# Patient Record
Sex: FEMALE
[De-identification: names, ages, dates, MRNs, and addresses within clinical notes are randomized per-mention and may not be internally consistent; named-entity substitution may affect disease eponyms.]

---

## 2017-07-28 ENCOUNTER — APPOINTMENT (OUTPATIENT)
Dept: FAMILY MEDICINE | Facility: CLINIC | Age: 82
End: 2017-07-28

## 2023-12-19 ENCOUNTER — APPOINTMENT (OUTPATIENT)
Dept: FAMILY MEDICINE | Facility: CLINIC | Age: 88
End: 2023-12-19
Payer: MEDICAID

## 2023-12-19 VITALS
SYSTOLIC BLOOD PRESSURE: 130 MMHG | HEIGHT: 57 IN | OXYGEN SATURATION: 94 % | HEART RATE: 94 BPM | BODY MASS INDEX: 21.36 KG/M2 | TEMPERATURE: 97.3 F | WEIGHT: 99 LBS | DIASTOLIC BLOOD PRESSURE: 80 MMHG

## 2023-12-19 DIAGNOSIS — Z13.220 ENCOUNTER FOR SCREENING FOR LIPOID DISORDERS: ICD-10-CM

## 2023-12-19 DIAGNOSIS — E56.9 VITAMIN DEFICIENCY, UNSPECIFIED: ICD-10-CM

## 2023-12-19 DIAGNOSIS — Z00.00 ENCOUNTER FOR GENERAL ADULT MEDICAL EXAMINATION W/OUT ABNORMAL FINDINGS: ICD-10-CM

## 2023-12-19 DIAGNOSIS — R32 UNSPECIFIED URINARY INCONTINENCE: ICD-10-CM

## 2023-12-19 DIAGNOSIS — Z13.29 ENCOUNTER FOR SCREENING FOR OTHER SUSPECTED ENDOCRINE DISORDER: ICD-10-CM

## 2023-12-19 DIAGNOSIS — Z13.228 ENCOUNTER FOR SCREENING FOR OTHER SUSPECTED ENDOCRINE DISORDER: ICD-10-CM

## 2023-12-19 DIAGNOSIS — Z78.9 OTHER SPECIFIED HEALTH STATUS: ICD-10-CM

## 2023-12-19 DIAGNOSIS — I48.91 UNSPECIFIED ATRIAL FIBRILLATION: ICD-10-CM

## 2023-12-19 DIAGNOSIS — Z13.0 ENCOUNTER FOR SCREENING FOR OTHER SUSPECTED ENDOCRINE DISORDER: ICD-10-CM

## 2023-12-19 PROCEDURE — 99203 OFFICE O/P NEW LOW 30 MIN: CPT | Mod: 25

## 2023-12-19 PROCEDURE — 99387 INIT PM E/M NEW PAT 65+ YRS: CPT | Mod: 25

## 2023-12-19 RX ORDER — ALPRAZOLAM 0.5 MG/1
0.5 TABLET ORAL
Refills: 0 | Status: ACTIVE | COMMUNITY

## 2023-12-19 RX ORDER — ASPIRIN 81 MG
81 TABLET, DELAYED RELEASE (ENTERIC COATED) ORAL
Refills: 0 | Status: ACTIVE | COMMUNITY

## 2023-12-19 NOTE — PHYSICAL EXAM
[No Acute Distress] : no acute distress [Well-Appearing] : well-appearing [No Accessory Muscle Use] : no accessory muscle use [Clear to Auscultation] : lungs were clear to auscultation bilaterally [Normal Rate] : normal rate  [Regular Rhythm] : with a regular rhythm [Normal S1, S2] : normal S1 and S2 [Soft] : abdomen soft [Non Tender] : non-tender [Non-distended] : non-distended [Coordination Grossly Intact] : coordination grossly intact [Deep Tendon Reflexes (DTR)] : deep tendon reflexes were 2+ and symmetric [Normal Affect] : the affect was normal [Normal Insight/Judgement] : insight and judgment were intact [No Edema] : there was no peripheral edema

## 2023-12-20 LAB
25(OH)D3 SERPL-MCNC: 38.4 NG/ML
ALBUMIN SERPL ELPH-MCNC: 4 G/DL
ALP BLD-CCNC: 131 U/L
ALT SERPL-CCNC: 26 U/L
ANION GAP SERPL CALC-SCNC: 11 MMOL/L
AST SERPL-CCNC: 31 U/L
BASOPHILS # BLD AUTO: 0.07 K/UL
BASOPHILS NFR BLD AUTO: 0.9 %
BILIRUB SERPL-MCNC: 0.7 MG/DL
BUN SERPL-MCNC: 23 MG/DL
CALCIUM SERPL-MCNC: 9.2 MG/DL
CHLORIDE SERPL-SCNC: 103 MMOL/L
CHOLEST SERPL-MCNC: 178 MG/DL
CO2 SERPL-SCNC: 27 MMOL/L
CREAT SERPL-MCNC: 0.74 MG/DL
EGFR: 78 ML/MIN/1.73M2
EOSINOPHIL # BLD AUTO: 0.09 K/UL
EOSINOPHIL NFR BLD AUTO: 1.2 %
ESTIMATED AVERAGE GLUCOSE: 97 MG/DL
FOLATE SERPL-MCNC: 13.4 NG/ML
GLUCOSE SERPL-MCNC: 91 MG/DL
HBA1C MFR BLD HPLC: 5 %
HCT VFR BLD CALC: 42.1 %
HDLC SERPL-MCNC: 57 MG/DL
HGB BLD-MCNC: 13.4 G/DL
IMM GRANULOCYTES NFR BLD AUTO: 0.3 %
LDLC SERPL CALC-MCNC: 107 MG/DL
LYMPHOCYTES # BLD AUTO: 1.28 K/UL
LYMPHOCYTES NFR BLD AUTO: 17.3 %
MAN DIFF?: NORMAL
MCHC RBC-ENTMCNC: 30.5 PG
MCHC RBC-ENTMCNC: 31.8 GM/DL
MCV RBC AUTO: 95.9 FL
MONOCYTES # BLD AUTO: 0.64 K/UL
MONOCYTES NFR BLD AUTO: 8.6 %
NEUTROPHILS # BLD AUTO: 5.31 K/UL
NEUTROPHILS NFR BLD AUTO: 71.7 %
NONHDLC SERPL-MCNC: 120 MG/DL
PLATELET # BLD AUTO: 248 K/UL
POTASSIUM SERPL-SCNC: 3.9 MMOL/L
PROT SERPL-MCNC: 7.2 G/DL
RBC # BLD: 4.39 M/UL
RBC # FLD: 13.3 %
SODIUM SERPL-SCNC: 140 MMOL/L
TRIGL SERPL-MCNC: 72 MG/DL
TSH SERPL-ACNC: 1.06 UIU/ML
VIT B12 SERPL-MCNC: 596 PG/ML
WBC # FLD AUTO: 7.41 K/UL

## 2024-01-10 ENCOUNTER — APPOINTMENT (OUTPATIENT)
Dept: FAMILY MEDICINE | Facility: CLINIC | Age: 89
End: 2024-01-10
Payer: MEDICAID

## 2024-01-10 PROCEDURE — 99442: CPT

## 2024-01-22 ENCOUNTER — APPOINTMENT (OUTPATIENT)
Dept: FAMILY MEDICINE | Facility: CLINIC | Age: 89
End: 2024-01-22
Payer: MEDICAID

## 2024-01-22 DIAGNOSIS — Z91.89 OTHER SPECIFIED PERSONAL RISK FACTORS, NOT ELSEWHERE CLASSIFIED: ICD-10-CM

## 2024-01-22 DIAGNOSIS — Z91.81 HISTORY OF FALLING: ICD-10-CM

## 2024-01-22 PROCEDURE — 99214 OFFICE O/P EST MOD 30 MIN: CPT | Mod: 95

## 2024-01-22 NOTE — PHYSICAL EXAM
[No Acute Distress] : no acute distress [de-identified] : open sacral wound, difficult to quantify size due to video appointment. no oozing, drainage or odor from wound

## 2024-01-22 NOTE — ADDENDUM
[FreeTextEntry1] : ADDENDUM 1/22/2024:  Medical necessity - semi-electric hospital bed Patient is an 89yo female with PMH of AFib, s/p PPM, dementia, chronic low back pain with sciatica, Paget disease, OA, chronic hepatitis C with severe functional limitations with medical need for a semi-electric hospital bed with bed overlay and bedrails, head of bed >30 degrees given altered mental status and aspiration risk. Requires semi-electric hospital bed to raise head of bed >30 degrees and position patient which otherwise not feasible with an ordinary bed that does not have traction equipment. Patient is unable to perform ADLs and IADLs independently, requires assistance with all functional activities. Patient's range of motion limited, strength 2/5 in all extremities. Patient is A&Ox1 to person only, with progressive dementia. She now has a Stage 2 decubitus sacral ulcer with concern that current bed may have led to degradation of wound. Patient requires 1 person assist for any bed movements, requires 1 person transfer. Ambulation is dependent on rolling walker or wheelchair. Has aides 12 hours a day, 5 days per week.

## 2024-01-22 NOTE — HISTORY OF PRESENT ILLNESS
[FreeTextEntry1] : CPE, establish care  [de-identified] : 87yo female with PMH of AFib, s/p PPM, dementia, chronic low back pain with sciatica, Paget disease, OA, chronic hepatitis C who presents to the office to establish care.   Patient was formerly a patient under Dr. Hernán Campbell. She was last seen by Dr. Campbell about a month ago for a normal follow up appointment. Daughter was very upset with the care at that office.   History of atrial fibrillation, she is no longer on rate controlling agents as she had adverse reactions, which made patient "crazy." No longer on anticoagulation due to concern of her age and fall risk.   Daughter is concerned because during the full moon patient will sundown and talk to family members who are not there. Uses Xanax 0.5mg PRN for acute agitation/psychosis at night. This was last prescribed by previous PMD on 11/1/23.   Has aides that come to the house 70 hours a week (5 days x12 hours).   Has walker and wheelchair for ambulation. She is dependent on these for ambulation and cannot walk without them. Additionally, patient is unable to get out of bed without assistance. Daughter is requesting a hospital bed to help with her ambulation.

## 2024-01-22 NOTE — REVIEW OF SYSTEMS
[Memory Loss] : memory loss [Fever] : no fever [Chills] : no chills [Chest Pain] : no chest pain [Shortness Of Breath] : no shortness of breath [Palpitations] : no palpitations [Abdominal Pain] : no abdominal pain [Nausea] : no nausea [Constipation] : no constipation [Diarrhea] : diarrhea [Vomiting] : no vomiting [Headache] : no headache [Dizziness] : no dizziness

## 2024-01-22 NOTE — ASSESSMENT
[FreeTextEntry1] : #Sacral ulcer - Presenting for follow up of wound  - Minimal improvement with mupirocin, continues to worsen - Wound is open with slough and macerated edges, concern for Stage 2 sacral wound - Difficult to quantify size on telehealth, but likely 2 inches in diameter - No signs of systemic infection like fever, chills - Patient is immobile, unable to leave house and needs visiting nurse services for wound care for above - Encouraged frequent offloading of area to prevent further tissue injury, this will be feasible with hospital bed - Continue medhoney with nonadhesive bandage to area  #Dementia with behavioral disturbance - Mental status continues to decline - Has long history of intolerance to antipsychotic medications  - Was placed on alprazolam by prior PMD, discussed that benzodiazepines are not preferred choice for elderly population but given intolerance to other medications would recommend to continue on a PRN basis only, limit

## 2024-01-22 NOTE — HISTORY OF PRESENT ILLNESS
[Home] : at home, [unfilled] , at the time of the visit. [Medical Office: ([facility])___] : at the medical office located in  [Family Member] : family member [Verbal consent obtained from patient] : the patient, [unfilled] [FreeTextEntry1] : Follow up [de-identified] : 89yo female with PMH of AFib, s/p PPM, dementia with behavioral disturbance, chronic low back pain with sciatica, Paget disease, OA, chronic hepatitis C who presents for follow up of sacral wound.  Telephonic visit on 1/10/24 with daughter revealed concern for sacral wound. Patient is largely immobile in bed throughout the entire day, she likes to solely lie on her back. There was overall concern for development of pressure injury.  Patient was given mupirocin, daughter reports wound looked worse, now with eschar on top. Instructed by RN at our office to place medhoney and nonadherent dressing over the wound.   Patient does have long history of dementia with behavioral disturbance at night. Has tried Risperdal, Seroquel, nuplazid, melatonin, memantine which all have made confusion much worse. Last PMD gave her alprazolam which sometimes helps with agitation. Daughter is seeking guidance on what to do next for her agitation.   Still waiting on hospital bed, however, patient's daughter had requested fully electronic/motorized bed which is not covered under her insurance.  She is still having issues with picking up ensure from her pharmacy.

## 2024-01-22 NOTE — HEALTH RISK ASSESSMENT
[No] : No [No falls in past year] : Patient reported no falls in the past year [Other reason not done] : Other reason not done [Patient reported mammogram was normal] : Patient reported mammogram was normal [Patient reported colonoscopy was normal] : Patient reported colonoscopy was normal [Change in mental status noted] : Change in mental status noted [Behavior] : difficulty with behavior [Reasoning] : difficulty with reasoning [None] : None [With Family] : lives with family [Some assistance needed] : feeding [Full assistance needed] : managing finances [Reports changes in hearing] : Reports changes in hearing [Reports changes in dental health] : Reports changes in dental health [Smoke Detector] : smoke detector [Carbon Monoxide Detector] : carbon monoxide detector [Designated Healthcare Proxy] : Designated healthcare proxy [Name: ___] : Health Care Proxy's Name: [unfilled]  [Relationship: ___] : Relationship: [unfilled] [Former] : Former [> 15 Years] : > 15 Years [de-identified] : none [de-identified] : ensure [Reports changes in vision] : Reports no changes in vision [MammogramComments] : age out  [PapSmearComments] : age out [ColonoscopyComments] : age out [HepatitisCComments] : chronic hepatitis C  [AdvancecareDate] : 12/23

## 2024-01-22 NOTE — ASSESSMENT
[FreeTextEntry1] : #HCM - Patient presenting for annual physical exam - declines flu vaccine today - Based on USPSTF screening guidelines and shared decision-making with patient and daughter, age out of breast, cervical and colon cancer screenings  - ECG reviewed from previous PMD from 10/2023, shows sinus tachycardia which is known to the patient  - Will check routine blood work today and call patient with results   #Malnutrition - Daughter endorses poor PO intake - Patient takes ensure three times daily with meals for calorie intake - Daughter requesting prescription which she used to receive at prior PMD's office  #Incontinence - Patient with bowel and bladder incontinence requiring adult diapers - Patient's daughter requesting pull-up type of diapers and chucks for cleansing  #Unsteady gait, frailty - Patient requires assistive device to prevent falling and has rolling walker and wheelchair for ambulation - Patient has history of unsteady gait and cannot get out of bed without assistance from aide or family member. Patient requires hospital bed so the caregiver can position patient with moving her body which is otherwise not feasible with an ordinary bed that does not have traction equipment.

## 2024-01-23 PROBLEM — Z91.81 RISK FOR FALLS: Status: ACTIVE | Noted: 2024-01-23

## 2024-01-23 PROBLEM — Z91.89 AT RISK FOR ASPIRATION: Status: ACTIVE | Noted: 2024-01-23

## 2024-02-07 ENCOUNTER — APPOINTMENT (OUTPATIENT)
Dept: FAMILY MEDICINE | Facility: CLINIC | Age: 89
End: 2024-02-07
Payer: MEDICAID

## 2024-02-07 DIAGNOSIS — R19.7 DIARRHEA, UNSPECIFIED: ICD-10-CM

## 2024-02-07 DIAGNOSIS — E86.0 DEHYDRATION: ICD-10-CM

## 2024-02-07 PROCEDURE — 99442: CPT

## 2024-02-16 ENCOUNTER — APPOINTMENT (OUTPATIENT)
Dept: FAMILY MEDICINE | Facility: CLINIC | Age: 89
End: 2024-02-16
Payer: MEDICAID

## 2024-02-16 DIAGNOSIS — L98.429 NON-PRESSURE CHRONIC ULCER OF BACK WITH UNSPECIFIED SEVERITY: ICD-10-CM

## 2024-02-16 DIAGNOSIS — G47.00 INSOMNIA, UNSPECIFIED: ICD-10-CM

## 2024-02-16 DIAGNOSIS — R26.81 UNSTEADINESS ON FEET: ICD-10-CM

## 2024-02-16 DIAGNOSIS — E46 UNSPECIFIED PROTEIN-CALORIE MALNUTRITION: ICD-10-CM

## 2024-02-16 PROCEDURE — 99215 OFFICE O/P EST HI 40 MIN: CPT

## 2024-02-16 RX ORDER — HYDROXYZINE HYDROCHLORIDE 10 MG/1
10 TABLET ORAL
Qty: 30 | Refills: 0 | Status: ACTIVE | COMMUNITY
Start: 2024-02-16 | End: 1900-01-01

## 2024-02-16 NOTE — PHYSICAL EXAM
[de-identified] : ill appearing, does not respond to commands [de-identified] : skin tears of bilateral wrists, wrapped in gauze, left knee wound wrapped in gauze. sacral pressure ulcer is currently dressed and unable to be adequately assessed in this video appointment

## 2024-02-16 NOTE — HISTORY OF PRESENT ILLNESS
[Home] : at home, [unfilled] , at the time of the visit. [Medical Office: ([facility])___] : at the medical office located in  [Family Member] : family member [Verbal consent obtained from patient] : the patient, [unfilled] [FreeTextEntry8] : 87yo female with PMH of AFib, s/p PPM, dementia with behavioral disturbance, chronic low back pain with sciatica, Paget disease, OA, chronic hepatitis C who presents for concern of skin and memory changes. History obtained by daughter Ines.   Patient's daughter concerned about fragile skin on her wrists. Patient has been constantly scratching at her skin constantly. Patient's daughter took the patient to Urgent Care on 2/14/24 as no availability in the office, she was prescribed betamethasone cream to help with itchiness. Mittens placed to help avoid scratching.  Patient has been agitated throughout the day. Patient has been prescribed Xanax by previous PMD, has history of dementia with behavioral disturbance at night with sundowning, trying to get out of bed and is at increased fall risk. Patient has been trialed on numerous antipsychotics in the past (see previous notes) which she has experienced side effects to. Daughter has been giving Xanax 0.25mg for the past three nights, notes that her mother has been more confused during the day, frequently scratching herself and moving around/sliding down in bed.    Patient has been seeing Wound Care MD and has wound care RN coming to the house daily. Daughter reports sacral wound is healing well. Daughter bought Jamarcus to assist with wound care. Diarrhea has improved.  Has home physical therapy, patient is bedbound and a max two person assist to get out of bed. Daughter requesting kwasi lift as she cannot get her mother out of bed on her own.

## 2024-02-16 NOTE — ASSESSMENT
[FreeTextEntry1] : #Dementia with behavioral disturbance - Patient with worsening confusion after taking Xanax - Spoke with daughter that this is a common reaction with benzodiazepines and elderly patients, patient's daughter concerned that patient is in liver failure--reviewed recent labs from 12/19/2023 liver indices are within normal limits. Recommend against repeat blood work to avoid further agitation to the patient  - Will trial hydroxyzine for patient's agitation at night, may also help with intense pruritus - Unfortunately, we are severely limited in medications we can use to treat patient's agitation. She has trialed multiple antipsychotics and antidepressants and had adverse reactions to all of these medications.   #Sacral ulcer - Per daughter, sacral ulcer is healing - Continue wound care per wound care RN and MD - Patient's daughter purchased Jamarcus for patient, will try and send to pharmacy   #Protein malnutrition - Issues with Ensure coverage by Medicaid - Will resend - Patient's daughter has purchased Boost   #Unsteady gait - Patient's daughter requesting kwasi lift - Patient is max 2 person assist and is bedbound  I have spent 46 minutes on telehealth with patient's daughter during this visit.

## 2024-02-21 RX ORDER — MUPIROCIN 20 MG/G
2 OINTMENT TOPICAL TWICE DAILY
Qty: 1 | Refills: 0 | Status: ACTIVE | COMMUNITY
Start: 2024-01-10

## 2024-02-21 RX ORDER — NUT.TX.IMPAIRED DIGESTIVE FXN 0.035-1/ML
LIQUID (ML) ORAL
Qty: 90 | Refills: 3 | Status: ACTIVE | COMMUNITY
Start: 2023-12-19

## 2024-02-21 RX ORDER — ADHESIVE TAPE 3"X 2.3 YD
4"X4" TAPE, NON-MEDICATED TOPICAL
Qty: 1 | Refills: 0 | Status: ACTIVE | COMMUNITY
Start: 2024-01-19

## 2024-02-29 RX ORDER — ARGIN/GLUT/CAHMB/COLLAG/MV-MIN 7G-7G-1.5G
POWDER IN PACKET (EA) ORAL
Qty: 1 | Refills: 4 | Status: ACTIVE | COMMUNITY
Start: 2024-02-16

## 2024-03-05 ENCOUNTER — RX RENEWAL (OUTPATIENT)
Age: 89
End: 2024-03-05

## 2024-03-05 RX ORDER — CHOLESTYRAMINE 4 G/9G
4 POWDER, FOR SUSPENSION ORAL TWICE DAILY
Qty: 60 | Refills: 0 | Status: ACTIVE | COMMUNITY
Start: 2024-02-07 | End: 1900-01-01

## 2024-03-27 ENCOUNTER — APPOINTMENT (OUTPATIENT)
Dept: FAMILY MEDICINE | Facility: CLINIC | Age: 89
End: 2024-03-27
Payer: MEDICAID

## 2024-03-27 VITALS
SYSTOLIC BLOOD PRESSURE: 120 MMHG | OXYGEN SATURATION: 93 % | WEIGHT: 104 LBS | DIASTOLIC BLOOD PRESSURE: 80 MMHG | HEART RATE: 76 BPM | BODY MASS INDEX: 22.51 KG/M2 | TEMPERATURE: 97.7 F

## 2024-03-27 DIAGNOSIS — R54 AGE-RELATED PHYSICAL DEBILITY: ICD-10-CM

## 2024-03-27 DIAGNOSIS — S31.809A UNSPECIFIED OPEN WOUND OF UNSPECIFIED BUTTOCK, INITIAL ENCOUNTER: ICD-10-CM

## 2024-03-27 DIAGNOSIS — F03.918 UNSPECIFIED DEMENTIA, UNSPECIFIED SEVERITY, WITH OTHER BEHAVIORAL DISTURBANCE: ICD-10-CM

## 2024-03-27 PROCEDURE — 99215 OFFICE O/P EST HI 40 MIN: CPT

## 2024-03-27 PROCEDURE — G2211 COMPLEX E/M VISIT ADD ON: CPT | Mod: NC,1L

## 2024-03-27 NOTE — HISTORY OF PRESENT ILLNESS
[Family Member] : family member [FreeTextEntry1] : Follow up [de-identified] : 87yo female with PMH of AFib, dementia with behavioral disturbance, sacral ulcer, failure to thrive, chronic hepatitis C who presents to the office for follow up.   Daughter Ines provides history.   She has been following with wound care for management of sacral ulcer. She had wound vacuum placed. Friday, 3/22/24 wound care nursing services were terminated. Patient's daughter had issue with nurse at the time of that visit. Visiting nurse did not change the wound at that last appointment. She saw the wound care doctor today, reports that insurance is denying to continue the wound vacuum. She is trying to appeal this. Reports that that the wound is closing and improving and she does not understand why insurance will not cover this.  Daughter is concerned about her mental status. She reports mother has declined. We have discussed this numerous times in the past. Patient's daughter had given the patient Xanax to help calm her down while wound vac is in place, which was back in the end of January. Patient was somnolent after taking the mediation. Patient's daughter was concerned that her mother had a stroke. When she had mentioned this to me, I did recommend seeking urgent care at the hospital if she was concerned that her mother had a stroke. She did not do so. She has mentioned her mother's agitation and confused behavior several times, which has been documented in several acute and telephonic visits. Patient's daughter brought out her phone during the visit, showing multiple videos with dates ranging from January-mid February of the patient sitting up right, eating independently. This appears to conflict her previous reports of the patient being agitated throughout the day. Patient's daughter is very convinced that she still has Alprazolam 0.25mg in her system, despite reported last dose 2/16/24.  Patient has been having a slight cough and runny nose. Daughter notes that she always keeps the head of the bed elevated when patient is eating and after meals. No choking episodes.   Patient's daughter again comments that she never received Ensure. The office has received insurance approval for the medication and has alerted the patient's daughter of this. I am unsure of the issue of obtaining the Ensure if approved by the insurance.   Patient was being treated by physical therapy. Again, the daughter did have personal disagreement with one physical therapist. A different physical therapist had called for phone orders last week, I gave the order to continue physical therapy. Patient's daughter states she was discharged from physical therapy last week and is extremely upset about this.  She informs me that adult protective services is now involved. I had received a call regarding the case and spoke with the  Milagros Rosenbaum on 3/19/24. Daughter reports she will have a visit tomorrow. She is requesting to have copies of authorizations placed for patient's ensure to show the .

## 2024-03-27 NOTE — REVIEW OF SYSTEMS
[Cough] : cough [Confusion] : confusion [Nasal Discharge] : nasal discharge [Fever] : no fever [Chills] : no chills [Headache] : no headache [Dizziness] : no dizziness

## 2024-03-27 NOTE — PHYSICAL EXAM
[Clear to Auscultation] : lungs were clear to auscultation bilaterally [No Respiratory Distress] : no respiratory distress  [Normal Rate] : normal rate  [Regular Rhythm] : with a regular rhythm [Normal S1, S2] : normal S1 and S2 [de-identified] : lethargic, arouses to verbal stimuli, in wheelchair [de-identified] : wound vac in place

## 2024-03-27 NOTE — ASSESSMENT
[FreeTextEntry1] : #Dementia with behavioral disturbance/failure to thrive - Patient's daughter concerned about worsening mental status, which has been discussed numerous times with previous telephonic and telehealth visits - The timeline of this decline is perplexing. She first noted a decline in 1/10/24 with increasing weakness. In subsequent encounters, she did mention increased agitation in the day and night. She shows me videos today which are dated from 1/2024-2/2024 of the patient calmly eating her food and walking with physical therapy and does not appear confused - Patient had been prescribed Xanax by her previous PMD for agitations. Her daughter did ask me about giving this medication. We discussed the serious side effects of alprazolam on elderly patients, particularly those with dementia which can lead to worsening confusion and somnolence. I told the daughter I would not be comfortable prescribing this medication and that she should use this very sparingly for acute agitated episodes only. Daughter had given the patient Xanax in February 2024 when she had a wound vac placed because she was concerned that the patient would become agitated, gave 0.25mg three days in a row and has been lethargic since then - The etiology of her increased lethargy and confusion may be from alprazolam, however, suspect this is less likely. I suspect that the patient has had an acute deterioration from sacral wound.  - Daughter was concerned that her mother had a stroke in January, I did speak with the daughter and stated that she should bring the patient to the hospital if there were acute concerns of altered mental status with stroke like symptoms. The patient's daughter did not do this. She is requesting CT head today. Patient is able to move extremities. I am unable to assess language or following commands due to language barrier. She is lethargic on examination and cannot follow commands properly. I do not believe a CT scan is necessary at this time. Will check CBC and CMP to rule out infection, dehydration.   #Sacral wound - Following with wound care MD - Patient's daughter with personal issues with wound care nursing team, reports services ended last Friday, 3/22/24 due to a verbal disagreeement - She followed with wound care MD today, informs me that wound is healing - She has been denied further wound vacuum treatments from insurance, appeal denied. Planning to pay out of pocket for resources   Time spent: I have spent 45 minutes of direct face-to-face time with the patient and daughter on this visit.

## 2024-03-29 LAB
ALBUMIN SERPL ELPH-MCNC: 3.3 G/DL
ALP BLD-CCNC: 103 U/L
ALT SERPL-CCNC: 22 U/L
ANION GAP SERPL CALC-SCNC: 16 MMOL/L
AST SERPL-CCNC: 29 U/L
BASOPHILS # BLD AUTO: 0.11 K/UL
BASOPHILS NFR BLD AUTO: 0.8 %
BILIRUB SERPL-MCNC: 0.3 MG/DL
BUN SERPL-MCNC: 60 MG/DL
CALCIUM SERPL-MCNC: 9.2 MG/DL
CHLORIDE SERPL-SCNC: 120 MMOL/L
CO2 SERPL-SCNC: 24 MMOL/L
CREAT SERPL-MCNC: 0.82 MG/DL
EGFR: 69 ML/MIN/1.73M2
EOSINOPHIL # BLD AUTO: 0.03 K/UL
EOSINOPHIL NFR BLD AUTO: 0.2 %
GLUCOSE SERPL-MCNC: 107 MG/DL
HCT VFR BLD CALC: 50.5 %
HGB BLD-MCNC: 14.3 G/DL
IMM GRANULOCYTES NFR BLD AUTO: 1.3 %
LYMPHOCYTES # BLD AUTO: 1.68 K/UL
LYMPHOCYTES NFR BLD AUTO: 12.7 %
MAN DIFF?: NORMAL
MCHC RBC-ENTMCNC: 28.3 GM/DL
MCHC RBC-ENTMCNC: 28.7 PG
MCV RBC AUTO: 101.4 FL
MONOCYTES # BLD AUTO: 1.1 K/UL
MONOCYTES NFR BLD AUTO: 8.3 %
NEUTROPHILS # BLD AUTO: 10.19 K/UL
NEUTROPHILS NFR BLD AUTO: 76.7 %
PLATELET # BLD AUTO: 236 K/UL
POTASSIUM SERPL-SCNC: 4.6 MMOL/L
PROT SERPL-MCNC: 7.4 G/DL
RBC # BLD: 4.98 M/UL
RBC # FLD: 14.7 %
SODIUM SERPL-SCNC: 160 MMOL/L
WBC # FLD AUTO: 13.28 K/UL